# Patient Record
Sex: FEMALE | Race: WHITE
[De-identification: names, ages, dates, MRNs, and addresses within clinical notes are randomized per-mention and may not be internally consistent; named-entity substitution may affect disease eponyms.]

---

## 2018-05-18 ENCOUNTER — HOSPITAL ENCOUNTER (EMERGENCY)
Dept: HOSPITAL 13 - EME | Age: 18
Discharge: HOME | End: 2018-05-18
Payer: COMMERCIAL

## 2018-05-18 VITALS — BODY MASS INDEX: 23.11 KG/M2 | WEIGHT: 147.27 LBS | HEIGHT: 67 IN

## 2018-05-18 VITALS — DIASTOLIC BLOOD PRESSURE: 73 MMHG | SYSTOLIC BLOOD PRESSURE: 132 MMHG

## 2018-05-18 DIAGNOSIS — E03.9: ICD-10-CM

## 2018-05-18 DIAGNOSIS — F41.9: ICD-10-CM

## 2018-05-18 DIAGNOSIS — J06.9: ICD-10-CM

## 2018-05-18 DIAGNOSIS — N39.0: Primary | ICD-10-CM

## 2018-05-18 LAB
ANA TITR SER IF: (no result) /HPF (ref 0–5)
BACTERIA #/AREA URNS HPF: (no result) /HPF
BASOPHILS # BLD AUTO: 12.2 G/DL (ref 11.9–15.5)
BILIRUBIN: NEGATIVE
BLOOD: NEGATIVE
CAOX CRY #/AREA URNS HPF: (no result) /HPF
CHLORIDE: 106 MEQ/L (ref 99–109)
COLOR UR: YELLOW
CREATININE: 0.7 MG/DL (ref 0.6–1.3)
D DIMER PPP FEU-MCNC: NEGATIVE
EPITHELIAL CELLS: (no result) /HPF
GFR SERPLBLD CREATININE-BSD FMLAMALE: > 59 ML/MIN/
GLUCOSE (STRIP): NEGATIVE
GLUCOSE SERPL-MCNC: 140 MG/DL (ref 70–99)
HCO3 BLD-SCNC: 24 MEQ/L (ref 20–31)
HCT VFR BLD CALC: 33.9 % (ref 36–46)
IRON SERPL-MCNC: 5 UG/DL
LDLC SERPL DIRECT ASSAY-MCNC: (no result) MG/DL
MCH RBC QN AUTO: 30 PG (ref 29–34)
MCV: 83.5 FL (ref 83–99)
MONOCYTES # BLD MANUAL: 36 G/DL (ref 30–36)
MONOSPOT (MONONUCLEOSIS SEROL): NEGATIVE
MUCOUS THREADS #/AREA URNS LPF: (no result) /LPF
NITRITE: NEGATIVE
NRBC (%): 0 /100 WBC (ref 0–0)
PLATELET COUNT: 224 K/UL (ref 156–360)
POTASSIUM SERPL-SCNC: 4 MEQ/L (ref 3.7–5.4)
RBC # BLD AUTO: 4.06 M/UL (ref 3.8–5.2)
RBC DIS.WIDTH-CV: 12 % (ref 11.8–14.6)
RBC DIS.WIDTH-SD: 36.9 % (ref 39–53)
RED BLOOD CELLS: (no result) /HPF (ref 0–5)
SODIUM: 137 MEQ/L (ref 136–147)
SPECIFIC GRAVITY: 1.01 (ref 1–1.03)
UCUL ADDED?: YES
UREA NITROGEN (BUN): 9 MG/DL (ref 9–23)
UROBILINOGEN UR-MCNC: 0.2 MG/DL (ref 0.2–1)
WBC # BLD AUTO: (no result) 10*3/UL
WBC # BLD AUTO: 8.3 K/UL (ref 4.1–10.2)

## 2018-06-27 ENCOUNTER — HOSPITAL ENCOUNTER (EMERGENCY)
Dept: HOSPITAL 13 - EME | Age: 18
Discharge: HOME | End: 2018-06-27
Payer: COMMERCIAL

## 2018-06-27 VITALS — SYSTOLIC BLOOD PRESSURE: 124 MMHG | DIASTOLIC BLOOD PRESSURE: 68 MMHG

## 2018-06-27 VITALS — WEIGHT: 143.3 LBS | BODY MASS INDEX: 23.03 KG/M2 | HEIGHT: 66 IN

## 2018-06-27 DIAGNOSIS — F32.9: ICD-10-CM

## 2018-06-27 DIAGNOSIS — E03.9: ICD-10-CM

## 2018-06-27 DIAGNOSIS — F41.9: Primary | ICD-10-CM

## 2023-02-22 ENCOUNTER — APPOINTMENT (OUTPATIENT)
Dept: URBAN - METROPOLITAN AREA SURGERY 23 | Age: 23
Setting detail: DERMATOLOGY
End: 2023-02-22

## 2023-02-22 DIAGNOSIS — L20.89 OTHER ATOPIC DERMATITIS: ICD-10-CM

## 2023-02-22 PROBLEM — L30.9 DERMATITIS, UNSPECIFIED: Status: ACTIVE | Noted: 2023-02-22

## 2023-02-22 PROCEDURE — 11104 PUNCH BX SKIN SINGLE LESION: CPT

## 2023-02-22 PROCEDURE — OTHER COUNSELING: OTHER

## 2023-02-22 PROCEDURE — OTHER PRESCRIPTION: OTHER

## 2023-02-22 PROCEDURE — OTHER MIPS QUALITY: OTHER

## 2023-02-22 PROCEDURE — 11105 PUNCH BX SKIN EA SEP/ADDL: CPT

## 2023-02-22 PROCEDURE — OTHER BIOPSY BY PUNCH METHOD: OTHER

## 2023-02-22 RX ORDER — TRIAMCINOLONE ACETONIDE 1 MG/G
0.1% CREAM TOPICAL BID
Qty: 454 | Refills: 2 | Status: ERX | COMMUNITY
Start: 2023-02-22

## 2023-02-22 ASSESSMENT — LOCATION ZONE DERM
LOCATION ZONE: TRUNK
LOCATION ZONE: ARM
LOCATION ZONE: TRUNK

## 2023-02-22 ASSESSMENT — LOCATION SIMPLE DESCRIPTION DERM
LOCATION SIMPLE: LEFT LOWER BACK
LOCATION SIMPLE: RIGHT UPPER BACK
LOCATION SIMPLE: LEFT LOWER BACK
LOCATION SIMPLE: RIGHT FOREARM

## 2023-02-22 ASSESSMENT — LOCATION DETAILED DESCRIPTION DERM
LOCATION DETAILED: RIGHT INFERIOR UPPER BACK
LOCATION DETAILED: LEFT SUPERIOR MEDIAL MIDBACK
LOCATION DETAILED: LEFT SUPERIOR MEDIAL MIDBACK
LOCATION DETAILED: RIGHT VENTRAL DISTAL FOREARM

## 2023-02-22 NOTE — PROCEDURE: MIPS QUALITY
Quality 110: Preventive Care And Screening: Influenza Immunization: Influenza Immunization previously received during influenza season
Quality 226: Preventive Care And Screening: Tobacco Use: Screening And Cessation Intervention: Patient screened for tobacco use and is an ex/non-smoker
Quality 431: Preventive Care And Screening: Unhealthy Alcohol Use - Screening: Patient not identified as an unhealthy alcohol user when screened for unhealthy alcohol use using a systematic screening method
Detail Level: Detailed
Quality 134: Screening For Clinical Depression And Follow-Up Plan: The patient was screened for depression and the screen was negative and no follow up required
Quality 130: Documentation Of Current Medications In The Medical Record: Current Medications Documented
Quality 402: Tobacco Use And Help With Quitting Among Adolescents: Patient screened for tobacco and never smoked

## 2023-03-08 ENCOUNTER — APPOINTMENT (OUTPATIENT)
Dept: URBAN - METROPOLITAN AREA SURGERY 23 | Age: 23
Setting detail: DERMATOLOGY
End: 2023-03-10

## 2023-03-08 DIAGNOSIS — Z48.02 ENCOUNTER FOR REMOVAL OF SUTURES: ICD-10-CM

## 2023-03-08 PROCEDURE — OTHER SUTURE REMOVAL (GLOBAL PERIOD): OTHER

## 2023-03-08 PROCEDURE — 99024 POSTOP FOLLOW-UP VISIT: CPT

## 2023-03-08 PROCEDURE — OTHER MIPS QUALITY: OTHER

## 2023-03-08 ASSESSMENT — LOCATION ZONE DERM
LOCATION ZONE: TRUNK
LOCATION ZONE: ARM
LOCATION ZONE: ARM
LOCATION ZONE: TRUNK

## 2023-03-08 ASSESSMENT — LOCATION DETAILED DESCRIPTION DERM
LOCATION DETAILED: RIGHT VENTRAL DISTAL FOREARM
LOCATION DETAILED: RIGHT VENTRAL DISTAL FOREARM
LOCATION DETAILED: LEFT SUPERIOR MEDIAL MIDBACK
LOCATION DETAILED: LEFT SUPERIOR MEDIAL MIDBACK

## 2023-03-08 ASSESSMENT — LOCATION SIMPLE DESCRIPTION DERM
LOCATION SIMPLE: LEFT LOWER BACK
LOCATION SIMPLE: RIGHT FOREARM
LOCATION SIMPLE: RIGHT FOREARM
LOCATION SIMPLE: LEFT LOWER BACK

## 2023-03-08 NOTE — PROCEDURE: MIPS QUALITY
Quality 402: Tobacco Use And Help With Quitting Among Adolescents: Patient screened for tobacco and never smoked
Detail Level: Detailed
Quality 431: Preventive Care And Screening: Unhealthy Alcohol Use - Screening: Patient not identified as an unhealthy alcohol user when screened for unhealthy alcohol use using a systematic screening method
Quality 130: Documentation Of Current Medications In The Medical Record: Current Medications Documented
Quality 110: Preventive Care And Screening: Influenza Immunization: Influenza Immunization previously received during influenza season
Quality 134: Screening For Clinical Depression And Follow-Up Plan: The patient was screened for depression and the screen was negative and no follow up required
Quality 226: Preventive Care And Screening: Tobacco Use: Screening And Cessation Intervention: Patient screened for tobacco use and is an ex/non-smoker

## 2023-03-08 NOTE — PROCEDURE: SUTURE REMOVAL (GLOBAL PERIOD)
Add 97329 Cpt? (Important Note: In 2017 The Use Of 19787 Is Being Tracked By Cms To Determine Future Global Period Reimbursement For Global Periods): yes
Detail Level: Detailed